# Patient Record
(demographics unavailable — no encounter records)

---

## 2024-11-06 NOTE — REVIEW OF SYSTEMS
Mr. Guillermo Ambrose is a  39-year-old  male with past medical history of obesity, hypertension, who presents to GI clinic as a referral for elevated liver enzymes and dilated bile ducts on ultrasound as well as some heartburn symptoms.   Patient reports that about 2 months ago he had an intense onset of right upper quadrant pain that lasted for about 4 days.  Was not worsened by eating but he was not eating very much during that time. .  No nausea or vomiting.  It completely resolved and he has not had that pain since then.  He described this to his primary care doctor who ordered labs and an ultrasound which showed some elevated liver enzymes but I do not have those records of the exact values and reportedly had dilated bile ducts on his ultrasound but I do not have the report.  He reports that the right upper quadrant pain completely resolved and he has not had that since.  He does have some heartburn that has been going on for the last 6 months and he takes Tums just about every day for this.  No dysphagia or unintentional weight loss.  No blood in the stool or black tarry stool.  No family history of GI malignancies or liver disease.  He works as a granite counter .  he does not smoke, drink alcohol, or use recreational drugs.  he uses Advil about 1 time per week.  His bowels are moving regularly about 2 times per day.  He does have some bloating but no significant abdominal pain currently.
[Negative] : Heme/Lymph
[Negative] : Heme/Lymph

## 2024-11-06 NOTE — DISCUSSION/SUMMARY
[Cardiomyopathy] : cardiomyopathy [Responding to Treatment] : responding to treatment [Echocardiogram] : echocardiogram [Coronary Artery Disease] : coronary artery disease [Hyperlipidemia] : hyperlipidemia [Lipids Test Panel] : a fasting lipid profile [Essential Hypertension] : essential hypertension [Stable] : stable [Patient] : the patient [___ Week(s)] : in [unfilled] week(s) [FreeTextEntry1] : Was given multiple DM meds to start, non-compliant with regimen, advised follow-up and discuss with PCP and/or endocrinologist. Preference from CV standpoint would be a SGLT2 inhibitor. LVEF <35% but still not on appropriate GDMT - has declined evaluation for ICD in the past.  Evidence of worsening heart failure added Metolazone to Lasix for diuresis. If euvolemic can exchange MRA for thiazide diuretic. No recent stress testing, ordering nuc stress for risk stratification as pt has hx of ascvd  echo today labs today, added farxiga to current regimen for HF

## 2024-11-06 NOTE — REASON FOR VISIT
[FreeTextEntry1] : 59 yr old male with history of ischemic cardiomyopathy, poorly controlled diabetes, hypertension and hyperlipidemia.  No recent chest pain, diaphoretic spells or palpitations. Mild continued SCANLON, 2-pillow orthopnea, improving on diuretics and Entresto. Now taking Entresto as prescribed.  Saw endocrinologist but was given "4 pills to take" and has not started any of them because of fear of s/e. Apparently only taking Metformin.  HPI: Ottoniel Bal has been experiencing shortness of breath, particularly when climbing stairs, which he used to do with ease. He also wakes up at 3:30 AM regularly due to breathlessness. He has noticed weight fluctuations and swelling in his legs. He has been taking Furosemide and has noticed some improvement, but the symptoms persist. He also reports feeling bloated and retaining water. He has a wound on his leg that is not healing well, which he attributes to fluid retention. He has been eating once a day, often feeling full after meals, which he believes is due to fluid retention in his gut.

## 2024-11-06 NOTE — PHYSICAL EXAM
[Well Developed] : well developed [Well Nourished] : well nourished [No Acute Distress] : no acute distress [Normal Venous Pressure] : normal venous pressure [No Carotid Bruit] : no carotid bruit [Normal S1, S2] : normal S1, S2 [No Murmur] : no murmur [No Rub] : no rub [No Gallop] : no gallop [Clear Lung Fields] : clear lung fields [Good Air Entry] : good air entry [No Respiratory Distress] : no respiratory distress  [Normal Gait] : normal gait [No Edema] : no edema [Edema ___] : edema [unfilled] [No Rash] : no rash [Moves all extremities] : moves all extremities [No Focal Deficits] : no focal deficits [Normal Speech] : normal speech [Alert and Oriented] : alert and oriented [Normal memory] : normal memory

## 2024-11-06 NOTE — HISTORY OF PRESENT ILLNESS
[FreeTextEntry1] : 59 yr old male with history of ischemic cardiomyopathy, poorly controlled diabetes, hypertension and hyperlipidemia.  No recent chest pain, diaphoretic spells or palpitations. Mild continued SCANLON, 2-pillow orthopnea, improving on diuretics and Entresto. Now taking Entresto as prescribed.  Saw endocrinologist but was given "4 pills to take" and has not started any of them because of fear of s/e. Apparently only taking Metformin.  Ottonile Bal has been experiencing shortness of breath, particularly when climbing stairs, which he used to do with ease. He also wakes up at 3:30 AM regularly due to breathlessness. He has noticed weight fluctuations and swelling in his legs. He has been taking Furosemide and has noticed some improvement, but the symptoms persist. He also reports feeling bloated and retaining water. He has a wound on his leg that is not healing well, which he attributes to fluid retention. He has been eating once a day, often feeling full after meals, which he believes is due to fluid retention in his gut.  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

## 2024-11-06 NOTE — CARDIOLOGY SUMMARY
[de-identified] : 10/16/24,Sinus Rhythm  -Nonspecific QRS widening and anterior fascicular block. -Left atrial enlargement. -Nonspecific T-abnormality.  6/19/24, Sinus Rhythm, -Left axis -anterior fascicular block.-Left atrial enlargement. -Poor R-wave progression -nonspecific -consider old anterior infarct. -Nonspecific T-abnormality. 3/1/22, Sinus  Rhythm  - occasional ectopic ventricular beat, -Left axis -anterior fascicular block.   Voltage criteria for LVH  (R(aVL) exceeds 1.26 mV).  -  T-abnormality  -Possible lateral ischemia.   8/16/21.Sinus Rhythm, - Negative T-waves -Possible Inferior ischemia, no change from ecg in 2019. [de-identified] : 3/1/22 - G2DD, dilated LV, ROMANA, mild MR, trace [place]. [de-identified] : 12/29/10, pci to mid LAD and distal RCA 12/27/17, mild diffuse disease throughout proximal and mid left anterior descending.  80% stenosis in D2, luminal irregularities with mild disease also seen in the mid and distal RCA.  EF by echo was 30%.

## 2024-11-06 NOTE — HISTORY OF PRESENT ILLNESS
[FreeTextEntry1] : 59 yr old male with history of ischemic cardiomyopathy, poorly controlled diabetes, hypertension and hyperlipidemia.  No recent chest pain, diaphoretic spells or palpitations. Mild continued SCANLON, 2-pillow orthopnea, improving on diuretics and Entresto. Now taking Entresto as prescribed.  Saw endocrinologist but was given "4 pills to take" and has not started any of them because of fear of s/e. Apparently only taking Metformin.  Ottoniel Bal has been experiencing shortness of breath, particularly when climbing stairs, which he used to do with ease. He also wakes up at 3:30 AM regularly due to breathlessness. He has noticed weight fluctuations and swelling in his legs. He has been taking Furosemide and has noticed some improvement, but the symptoms persist. He also reports feeling bloated and retaining water. He has a wound on his leg that is not healing well, which he attributes to fluid retention. He has been eating once a day, often feeling full after meals, which he believes is due to fluid retention in his gut.  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

## 2024-11-06 NOTE — CARDIOLOGY SUMMARY
[de-identified] : 10/16/24,Sinus Rhythm  -Nonspecific QRS widening and anterior fascicular block. -Left atrial enlargement. -Nonspecific T-abnormality.  6/19/24, Sinus Rhythm, -Left axis -anterior fascicular block.-Left atrial enlargement. -Poor R-wave progression -nonspecific -consider old anterior infarct. -Nonspecific T-abnormality. 3/1/22, Sinus  Rhythm  - occasional ectopic ventricular beat, -Left axis -anterior fascicular block.   Voltage criteria for LVH  (R(aVL) exceeds 1.26 mV).  -  T-abnormality  -Possible lateral ischemia.   8/16/21.Sinus Rhythm, - Negative T-waves -Possible Inferior ischemia, no change from ecg in 2019. [de-identified] : 3/1/22 - G2DD, dilated LV, ROMANA, mild MR, trace [place]. [de-identified] : 12/29/10, pci to mid LAD and distal RCA 12/27/17, mild diffuse disease throughout proximal and mid left anterior descending.  80% stenosis in D2, luminal irregularities with mild disease also seen in the mid and distal RCA.  EF by echo was 30%.

## 2025-05-09 NOTE — DISCUSSION/SUMMARY
[Cardiomyopathy] : cardiomyopathy [Responding to Treatment] : responding to treatment [Echocardiogram] : echocardiogram [Coronary Artery Disease] : coronary artery disease [Hyperlipidemia] : hyperlipidemia [Lipids Test Panel] : a fasting lipid profile [Essential Hypertension] : essential hypertension [Stable] : stable [Patient] : the patient

## 2025-05-11 NOTE — HISTORY OF PRESENT ILLNESS
[FreeTextEntry1] : 59 yr old male with history of ischemic cardiomyopathy, poorly controlled diabetes, hypertension and hyperlipidemia.  No recent chest pain, diaphoretic spells or palpitations. Mild continued SCANLON, 2-pillow orthopnea, improving on diuretics and Entresto. Now taking Entresto as prescribed.  Saw endocrinologist but was given "4 pills to take" and has not started any of them because of fear of s/e. Apparently only taking Metformin.  Ottoniel Bal has been experiencing shortness of breath, particularly when climbing stairs, which he used to do with ease. He also wakes up at 3:30 AM regularly due to breathlessness. He has noticed weight fluctuations and swelling in his legs. He has been taking Furosemide and has noticed some improvement, but the symptoms persist. He also reports feeling bloated and retaining water. He has a wound on his leg that is not healing well, which he attributes to fluid retention. He has been eating once a day, often feeling full after meals, which he believes is due to fluid retention in his gut.  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 He is unable to take his lasix and metolazone on a daily basis due to his work constrains, but reports when he felt "sluggish" the metolazone alleviated the gut congestion. Pt is still eating out and is unable to monitor the salt intake in the food but does not add any extra salt.  No chest pain but reports experiencing SOB.

## 2025-05-11 NOTE — CARDIOLOGY SUMMARY
[de-identified] : 10/16/24,Sinus Rhythm  -Nonspecific QRS widening and anterior fascicular block. -Left atrial enlargement. -Nonspecific T-abnormality.  6/19/24, Sinus Rhythm, -Left axis -anterior fascicular block.-Left atrial enlargement. -Poor R-wave progression -nonspecific -consider old anterior infarct. -Nonspecific T-abnormality. 3/1/22, Sinus  Rhythm  - occasional ectopic ventricular beat, -Left axis -anterior fascicular block.   Voltage criteria for LVH  (R(aVL) exceeds 1.26 mV).  -  T-abnormality  -Possible lateral ischemia.   8/16/21.Sinus Rhythm, - Negative T-waves -Possible Inferior ischemia, no change from ecg in 2019. [de-identified] : 3/1/22 - G2DD, dilated LV, ROMANA, mild MR, trace [place]. [de-identified] : 12/29/10, pci to mid LAD and distal RCA 12/27/17, mild diffuse disease throughout proximal and mid left anterior descending.  80% stenosis in D2, luminal irregularities with mild disease also seen in the mid and distal RCA.  EF by echo was 30%.

## 2025-05-11 NOTE — ADDENDUM
[FreeTextEntry1] : Chart reviewed. Worsening SCANLON likely multifactorial but most likely related to chronic combined systolic/diastolic heart failure and incomplete diuresis with worsening group 2 pulmonary hypertension. Issues of compliance in the past, currently not on SGLT2i or MRA. Emphasized importance of meds compliance, may consider referral to heart failure clinic if condition worsening at next visit in 2 weeks.

## 2025-05-11 NOTE — CARDIOLOGY SUMMARY
[de-identified] : 10/16/24,Sinus Rhythm  -Nonspecific QRS widening and anterior fascicular block. -Left atrial enlargement. -Nonspecific T-abnormality.  6/19/24, Sinus Rhythm, -Left axis -anterior fascicular block.-Left atrial enlargement. -Poor R-wave progression -nonspecific -consider old anterior infarct. -Nonspecific T-abnormality. 3/1/22, Sinus  Rhythm  - occasional ectopic ventricular beat, -Left axis -anterior fascicular block.   Voltage criteria for LVH  (R(aVL) exceeds 1.26 mV).  -  T-abnormality  -Possible lateral ischemia.   8/16/21.Sinus Rhythm, - Negative T-waves -Possible Inferior ischemia, no change from ecg in 2019. [de-identified] : 3/1/22 - G2DD, dilated LV, ROMANA, mild MR, trace [place]. [de-identified] : 12/29/10, pci to mid LAD and distal RCA 12/27/17, mild diffuse disease throughout proximal and mid left anterior descending.  80% stenosis in D2, luminal irregularities with mild disease also seen in the mid and distal RCA.  EF by echo was 30%.

## 2025-05-23 NOTE — CARDIOLOGY SUMMARY
[de-identified] : 10/16/24,Sinus Rhythm  -Nonspecific QRS widening and anterior fascicular block. -Left atrial enlargement. -Nonspecific T-abnormality.  6/19/24, Sinus Rhythm, -Left axis -anterior fascicular block.-Left atrial enlargement. -Poor R-wave progression -nonspecific -consider old anterior infarct. -Nonspecific T-abnormality. 3/1/22, Sinus  Rhythm  - occasional ectopic ventricular beat, -Left axis -anterior fascicular block.   Voltage criteria for LVH  (R(aVL) exceeds 1.26 mV).  -  T-abnormality  -Possible lateral ischemia.   8/16/21.Sinus Rhythm, - Negative T-waves -Possible Inferior ischemia, no change from ecg in 2019. [de-identified] : 11/6/24, 1. Technically difficult image quality. 2. Left ventricular cavity is severely dilated. Left ventricular wall thickness is normal. Left ventricular systolic function is severely decreased with an ejection fraction visually estimated at 30 to 35 %. Global left ventricular hypokinesis. 3. There is increased LV mass and eccentric hypertrophy. 4. Left ventricular global longitudinal strain is 3.3 % is abnormal (> -16%). Images were acquired on a [employer] ultrasound system and processed on the ultrasound machine with a heart rate of 76 bpm and a blood pressure of 137/72 mmHg. Reduced strain with localized wall stress at basal septum suggestive of hypertensive heart disease, clinical correlation indicated. 5. There is severe (grade 3) left ventricular diastolic dysfunction, with elevated left ventricular filling pressure. 6. Left atrium is severely dilated. 7. Mild mitral regurgitation. 8. Mild tricuspid regurgitation. 9. Estimated pulmonary artery systolic pressure is 66 mmHg, consistent with severe pulmonary hypertension. 10. Trace pulmonic regurgitation. 11. Compared to the transthoracic echocardiogram performed on 11/30/2023, there have been no significant interval changes. 3/1/22 - G2DD, dilated LV, ROMANA, mild MR, trace CT. [de-identified] : 12/29/10, pci to mid LAD and distal RCA 12/27/17, mild diffuse disease throughout proximal and mid left anterior descending.  80% stenosis in D2, luminal irregularities with mild disease also seen in the mid and distal RCA.  EF by echo was 30%.

## 2025-05-23 NOTE — HISTORY OF PRESENT ILLNESS
[FreeTextEntry1] : 59 yr old male with history of ischemic cardiomyopathy, poorly controlled diabetes, hypertension and hyperlipidemia.  No recent chest pain, diaphoretic spells or palpitations. Mild continued SCANLON, 2-pillow orthopnea, improving on diuretics and Entresto. Now taking Entresto as prescribed.  Saw endocrinologist but was given "4 pills to take" and has not started any of them because of fear of s/e. Apparently only taking Metformin.  Ottoniel Bal has been experiencing shortness of breath, particularly when climbing stairs, which he used to do with ease. He also wakes up at 3:30 AM regularly due to breathlessness. He has noticed weight fluctuations and swelling in his legs. He has been taking Furosemide and has noticed some improvement, but the symptoms persist. He also reports feeling bloated and retaining water. He has a wound on his leg that is not healing well, which he attributes to fluid retention. He has been eating once a day, often feeling full after meals, which he believes is due to fluid retention in his gut.  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 He is unable to take his lasix and metolazone on a daily basis due to his work constrains, but reports when he felt "sluggish" the metolazone alleviated the gut congestion. Pt is still eating out and is unable to monitor the salt intake in the food but does not add any extra salt.  No chest pain but reports experiencing SOB.   05/23/2025 PT is here today, reports improvement in diuresis, but still feels slightly bloated, still experiencing some slight dyspnea  pt reports the metolazone did improve his symptoms but it is not at goal as to where he once was in the past euvolemic on exam  no chest pain, mild sob

## 2025-05-23 NOTE — CARDIOLOGY SUMMARY
[de-identified] : 10/16/24,Sinus Rhythm  -Nonspecific QRS widening and anterior fascicular block. -Left atrial enlargement. -Nonspecific T-abnormality.  6/19/24, Sinus Rhythm, -Left axis -anterior fascicular block.-Left atrial enlargement. -Poor R-wave progression -nonspecific -consider old anterior infarct. -Nonspecific T-abnormality. 3/1/22, Sinus  Rhythm  - occasional ectopic ventricular beat, -Left axis -anterior fascicular block.   Voltage criteria for LVH  (R(aVL) exceeds 1.26 mV).  -  T-abnormality  -Possible lateral ischemia.   8/16/21.Sinus Rhythm, - Negative T-waves -Possible Inferior ischemia, no change from ecg in 2019. [de-identified] : 11/6/24, 1. Technically difficult image quality. 2. Left ventricular cavity is severely dilated. Left ventricular wall thickness is normal. Left ventricular systolic function is severely decreased with an ejection fraction visually estimated at 30 to 35 %. Global left ventricular hypokinesis. 3. There is increased LV mass and eccentric hypertrophy. 4. Left ventricular global longitudinal strain is 3.3 % is abnormal (> -16%). Images were acquired on a [employer] ultrasound system and processed on the ultrasound machine with a heart rate of 76 bpm and a blood pressure of 137/72 mmHg. Reduced strain with localized wall stress at basal septum suggestive of hypertensive heart disease, clinical correlation indicated. 5. There is severe (grade 3) left ventricular diastolic dysfunction, with elevated left ventricular filling pressure. 6. Left atrium is severely dilated. 7. Mild mitral regurgitation. 8. Mild tricuspid regurgitation. 9. Estimated pulmonary artery systolic pressure is 66 mmHg, consistent with severe pulmonary hypertension. 10. Trace pulmonic regurgitation. 11. Compared to the transthoracic echocardiogram performed on 11/30/2023, there have been no significant interval changes. 3/1/22 - G2DD, dilated LV, ROMANA, mild MR, trace [place]. [de-identified] : 12/29/10, pci to mid LAD and distal RCA 12/27/17, mild diffuse disease throughout proximal and mid left anterior descending.  80% stenosis in D2, luminal irregularities with mild disease also seen in the mid and distal RCA.  EF by echo was 30%.